# Patient Record
Sex: FEMALE | Race: WHITE | NOT HISPANIC OR LATINO | Employment: OTHER | URBAN - METROPOLITAN AREA
[De-identification: names, ages, dates, MRNs, and addresses within clinical notes are randomized per-mention and may not be internally consistent; named-entity substitution may affect disease eponyms.]

---

## 2023-05-18 LAB
ANION GAP IN SER/PLAS: 15 MMOL/L (ref 10–20)
CALCIUM (MG/DL) IN SER/PLAS: 8.7 MG/DL (ref 8.6–10.3)
CARBON DIOXIDE, TOTAL (MMOL/L) IN SER/PLAS: 35 MMOL/L (ref 21–32)
CHLORIDE (MMOL/L) IN SER/PLAS: 88 MMOL/L (ref 98–107)
CREATININE (MG/DL) IN SER/PLAS: 0.83 MG/DL (ref 0.5–1.05)
GFR FEMALE: 75 ML/MIN/1.73M2
GLUCOSE (MG/DL) IN SER/PLAS: 112 MG/DL (ref 74–99)
MAGNESIUM (MG/DL) IN SER/PLAS: 1.38 MG/DL (ref 1.6–2.4)
POTASSIUM (MMOL/L) IN SER/PLAS: 3 MMOL/L (ref 3.5–5.3)
SODIUM (MMOL/L) IN SER/PLAS: 135 MMOL/L (ref 136–145)
UREA NITROGEN (MG/DL) IN SER/PLAS: 15 MG/DL (ref 6–23)

## 2023-06-27 LAB
ANION GAP IN SER/PLAS: 14 MMOL/L (ref 10–20)
CALCIUM (MG/DL) IN SER/PLAS: 9.1 MG/DL (ref 8.6–10.3)
CARBON DIOXIDE, TOTAL (MMOL/L) IN SER/PLAS: 31 MMOL/L (ref 21–32)
CHLORIDE (MMOL/L) IN SER/PLAS: 97 MMOL/L (ref 98–107)
CREATININE (MG/DL) IN SER/PLAS: 0.94 MG/DL (ref 0.5–1.05)
ERYTHROCYTE DISTRIBUTION WIDTH (RATIO) BY AUTOMATED COUNT: 22.9 % (ref 11.5–14.5)
ERYTHROCYTE MEAN CORPUSCULAR HEMOGLOBIN CONCENTRATION (G/DL) BY AUTOMATED: 28.6 G/DL (ref 32–36)
ERYTHROCYTE MEAN CORPUSCULAR VOLUME (FL) BY AUTOMATED COUNT: 87 FL (ref 80–100)
ERYTHROCYTES (10*6/UL) IN BLOOD BY AUTOMATED COUNT: 4.79 X10E12/L (ref 4–5.2)
GFR FEMALE: 64 ML/MIN/1.73M2
GLUCOSE (MG/DL) IN SER/PLAS: 63 MG/DL (ref 74–99)
HEMATOCRIT (%) IN BLOOD BY AUTOMATED COUNT: 41.9 % (ref 36–46)
HEMOGLOBIN (G/DL) IN BLOOD: 12 G/DL (ref 12–16)
LEUKOCYTES (10*3/UL) IN BLOOD BY AUTOMATED COUNT: 9.7 X10E9/L (ref 4.4–11.3)
PLATELETS (10*3/UL) IN BLOOD AUTOMATED COUNT: 420 X10E9/L (ref 150–450)
POTASSIUM (MMOL/L) IN SER/PLAS: 3.8 MMOL/L (ref 3.5–5.3)
SODIUM (MMOL/L) IN SER/PLAS: 138 MMOL/L (ref 136–145)
UREA NITROGEN (MG/DL) IN SER/PLAS: 11 MG/DL (ref 6–23)

## 2023-08-28 ENCOUNTER — HOSPITAL ENCOUNTER (OUTPATIENT)
Dept: DATA CONVERSION | Facility: HOSPITAL | Age: 73
End: 2023-08-29
Attending: STUDENT IN AN ORGANIZED HEALTH CARE EDUCATION/TRAINING PROGRAM | Admitting: STUDENT IN AN ORGANIZED HEALTH CARE EDUCATION/TRAINING PROGRAM
Payer: MEDICARE

## 2023-08-28 DIAGNOSIS — N70.11 CHRONIC SALPINGITIS: ICD-10-CM

## 2023-08-28 DIAGNOSIS — D27.0 BENIGN NEOPLASM OF RIGHT OVARY: ICD-10-CM

## 2023-08-28 DIAGNOSIS — N70.01 ACUTE SALPINGITIS: ICD-10-CM

## 2023-08-28 DIAGNOSIS — I42.9 CARDIOMYOPATHY, UNSPECIFIED (MULTI): ICD-10-CM

## 2023-08-28 DIAGNOSIS — R19.09 OTHER INTRA-ABDOMINAL AND PELVIC SWELLING, MASS AND LUMP: ICD-10-CM

## 2023-08-28 DIAGNOSIS — D27.1 BENIGN NEOPLASM OF LEFT OVARY: ICD-10-CM

## 2023-08-28 DIAGNOSIS — R19.00 INTRA-ABDOMINAL AND PELVIC SWELLING, MASS AND LUMP, UNSPECIFIED SITE: ICD-10-CM

## 2023-08-28 DIAGNOSIS — E03.9 HYPOTHYROIDISM, UNSPECIFIED: ICD-10-CM

## 2023-08-28 DIAGNOSIS — N83.8 OTHER NONINFLAMMATORY DISORDERS OF OVARY, FALLOPIAN TUBE AND BROAD LIGAMENT: ICD-10-CM

## 2023-08-28 DIAGNOSIS — J44.9 CHRONIC OBSTRUCTIVE PULMONARY DISEASE, UNSPECIFIED (MULTI): ICD-10-CM

## 2023-08-29 LAB
ANION GAP IN SER/PLAS: 18 MMOL/L (ref 10–20)
CALCIUM (MG/DL) IN SER/PLAS: 9.6 MG/DL (ref 8.6–10.6)
CARBON DIOXIDE, TOTAL (MMOL/L) IN SER/PLAS: 29 MMOL/L (ref 21–32)
CHLORIDE (MMOL/L) IN SER/PLAS: 99 MMOL/L (ref 98–107)
CREATININE (MG/DL) IN SER/PLAS: 0.8 MG/DL (ref 0.5–1.05)
ERYTHROCYTE DISTRIBUTION WIDTH (RATIO) BY AUTOMATED COUNT: 14.9 % (ref 11.5–14.5)
ERYTHROCYTE MEAN CORPUSCULAR HEMOGLOBIN CONCENTRATION (G/DL) BY AUTOMATED: 29.9 G/DL (ref 32–36)
ERYTHROCYTE MEAN CORPUSCULAR VOLUME (FL) BY AUTOMATED COUNT: 89 FL (ref 80–100)
ERYTHROCYTES (10*6/UL) IN BLOOD BY AUTOMATED COUNT: 5 X10E12/L (ref 4–5.2)
GFR FEMALE: 78 ML/MIN/1.73M2
GLUCOSE (MG/DL) IN SER/PLAS: 77 MG/DL (ref 74–99)
HEMATOCRIT (%) IN BLOOD BY AUTOMATED COUNT: 44.5 % (ref 36–46)
HEMOGLOBIN (G/DL) IN BLOOD: 13.3 G/DL (ref 12–16)
LEUKOCYTES (10*3/UL) IN BLOOD BY AUTOMATED COUNT: 14.7 X10E9/L (ref 4.4–11.3)
MAGNESIUM (MG/DL) IN SER/PLAS: 2.51 MG/DL (ref 1.6–2.4)
NRBC (PER 100 WBCS) BY AUTOMATED COUNT: 0 /100 WBC (ref 0–0)
PLATELETS (10*3/UL) IN BLOOD AUTOMATED COUNT: 509 X10E9/L (ref 150–450)
POTASSIUM (MMOL/L) IN SER/PLAS: 4.9 MMOL/L (ref 3.5–5.3)
POTASSIUM (MMOL/L) IN SER/PLAS: 5.7 MMOL/L (ref 3.5–5.3)
SODIUM (MMOL/L) IN SER/PLAS: 140 MMOL/L (ref 136–145)
UREA NITROGEN (MG/DL) IN SER/PLAS: 18 MG/DL (ref 6–23)

## 2023-08-30 LAB
COMPLETE PATHOLOGY REPORT: NORMAL
CONVERTED CLINICAL DIAGNOSIS-HISTORY: NORMAL
CONVERTED DIAGNOSIS COMMENT: NORMAL
CONVERTED FINAL DIAGNOSIS: NORMAL
CONVERTED FINAL REPORT PDF LINK TO COPY AND PASTE: NORMAL
CONVERTED SPECIMEN DESCRIPTION: NORMAL

## 2023-09-12 PROBLEM — R42 LIGHTHEADEDNESS: Status: ACTIVE | Noted: 2023-09-12

## 2023-09-12 PROBLEM — R53.81 MALAISE: Status: ACTIVE | Noted: 2023-09-12

## 2023-09-12 PROBLEM — R74.01 ELEVATED TRANSAMINASE LEVEL: Status: ACTIVE | Noted: 2023-09-12

## 2023-09-12 PROBLEM — K76.1 HEPATIC CONGESTION: Status: ACTIVE | Noted: 2023-09-12

## 2023-09-12 PROBLEM — R04.2 HEMOPTYSIS: Status: ACTIVE | Noted: 2023-09-12

## 2023-09-12 PROBLEM — C32.0 MALIGNANT NEOPLASM OF GLOTTIS (MULTI): Status: ACTIVE | Noted: 2023-09-12

## 2023-09-12 PROBLEM — R04.1 HEMORRHAGE FROM THROAT: Status: ACTIVE | Noted: 2023-09-12

## 2023-09-12 PROBLEM — J01.90 ACUTE SINUSITIS: Status: ACTIVE | Noted: 2023-09-12

## 2023-09-12 PROBLEM — R09.89 PULMONARY VASCULAR CONGESTION: Status: ACTIVE | Noted: 2023-09-12

## 2023-09-12 PROBLEM — I95.1 ORTHOSTATIC HYPOTENSION: Status: ACTIVE | Noted: 2023-09-12

## 2023-09-12 PROBLEM — R19.00 PELVIC MASS IN FEMALE: Status: ACTIVE | Noted: 2023-09-12

## 2023-09-12 PROBLEM — R79.89 ELEVATED TROPONIN LEVEL: Status: ACTIVE | Noted: 2023-09-12

## 2023-09-12 PROBLEM — T81.49XA SURGICAL WOUND INFECTION: Status: ACTIVE | Noted: 2023-09-12

## 2023-09-12 PROBLEM — R11.10 EMESIS: Status: ACTIVE | Noted: 2023-09-12

## 2023-09-12 PROBLEM — I27.20 PULMONARY HYPERTENSION (MULTI): Status: ACTIVE | Noted: 2023-09-12

## 2023-09-12 PROBLEM — R13.10 DYSPHAGIA: Status: ACTIVE | Noted: 2023-09-12

## 2023-09-12 PROBLEM — E03.9 ACQUIRED HYPOTHYROIDISM: Status: ACTIVE | Noted: 2023-09-12

## 2023-09-12 PROBLEM — J18.9 PNEUMONIA: Status: ACTIVE | Noted: 2023-09-12

## 2023-09-12 PROBLEM — K21.9 GERD (GASTROESOPHAGEAL REFLUX DISEASE): Status: ACTIVE | Noted: 2023-09-12

## 2023-09-12 PROBLEM — C13.9: Status: ACTIVE | Noted: 2023-09-12

## 2023-09-12 PROBLEM — K04.7 DENTAL ABSCESS: Status: ACTIVE | Noted: 2023-09-12

## 2023-09-12 PROBLEM — T66.XXXA EFFECTS OF RADIATION: Status: ACTIVE | Noted: 2023-09-12

## 2023-09-12 PROBLEM — N83.8 MASS OF OVARY: Status: ACTIVE | Noted: 2023-09-12

## 2023-09-12 PROBLEM — J90 BILATERAL PLEURAL EFFUSION: Status: ACTIVE | Noted: 2023-09-12

## 2023-09-12 PROBLEM — I10 MALIGNANT ESSENTIAL HYPERTENSION: Status: ACTIVE | Noted: 2023-09-12

## 2023-09-12 PROBLEM — D49.59 BILATERAL OVARIAN TUMORS: Status: ACTIVE | Noted: 2023-09-12

## 2023-09-12 PROBLEM — R09.02 HYPOXIA: Status: ACTIVE | Noted: 2023-09-12

## 2023-09-12 PROBLEM — I50.30 (HFPEF) HEART FAILURE WITH PRESERVED EJECTION FRACTION (MULTI): Status: ACTIVE | Noted: 2023-09-12

## 2023-09-12 PROBLEM — J86.0 TEF (TRACHEOESOPHAGEAL FISTULA) (MULTI): Status: ACTIVE | Noted: 2023-09-12

## 2023-09-12 PROBLEM — T85.698A: Status: ACTIVE | Noted: 2023-09-12

## 2023-09-12 PROBLEM — S42.302A LEFT HUMERAL FRACTURE: Status: ACTIVE | Noted: 2023-09-12

## 2023-09-12 LAB
COMPLETE PATHOLOGY REPORT: NORMAL
CONVERTED CLINICAL DIAGNOSIS-HISTORY: NORMAL
CONVERTED FINAL DIAGNOSIS: NORMAL
CONVERTED FINAL REPORT PDF LINK TO COPY AND PASTE: NORMAL
CONVERTED GROSS DESCRIPTION: NORMAL
CONVERTED INTRAOPERATIVE DIAGNOSIS: NORMAL

## 2023-09-12 RX ORDER — ZINC GLUCONATE 50 MG
TABLET ORAL DAILY
COMMUNITY
Start: 2023-08-09

## 2023-09-12 RX ORDER — CHOLECALCIFEROL (VITAMIN D3) 25 MCG
1 TABLET ORAL DAILY
COMMUNITY
Start: 2018-07-26

## 2023-09-12 RX ORDER — METOPROLOL SUCCINATE 25 MG/1
25 TABLET, EXTENDED RELEASE ORAL DAILY
COMMUNITY
Start: 2023-07-24

## 2023-09-12 RX ORDER — LIOTHYRONINE SODIUM 25 UG/1
TABLET ORAL
COMMUNITY

## 2023-09-12 RX ORDER — ASPIRIN 325 MG
1 TABLET ORAL DAILY
COMMUNITY

## 2023-09-12 RX ORDER — ATORVASTATIN CALCIUM 10 MG/1
10 TABLET, FILM COATED ORAL DAILY
COMMUNITY

## 2023-09-12 RX ORDER — LEVOTHYROXINE SODIUM 75 UG/1
75 TABLET ORAL
COMMUNITY

## 2023-09-12 RX ORDER — SPIRONOLACTONE 25 MG/1
25 TABLET ORAL DAILY
COMMUNITY
Start: 2023-05-18

## 2023-09-12 RX ORDER — ESOMEPRAZOLE MAGNESIUM 40 MG/1
GRANULE, DELAYED RELEASE ORAL
COMMUNITY

## 2023-09-12 RX ORDER — CYCLOBENZAPRINE HCL 5 MG
5 TABLET ORAL EVERY 8 HOURS PRN
COMMUNITY
Start: 2023-04-29

## 2023-09-12 RX ORDER — FUROSEMIDE 40 MG/1
20 TABLET ORAL DAILY
COMMUNITY
Start: 2023-08-18

## 2023-09-12 RX ORDER — GABAPENTIN 250 MG/5ML
SOLUTION ORAL
COMMUNITY
Start: 2014-02-19

## 2023-09-12 RX ORDER — TRAZODONE HYDROCHLORIDE 50 MG/1
50 TABLET ORAL NIGHTLY
COMMUNITY
Start: 2023-04-29

## 2023-09-12 RX ORDER — HYDROXYUREA 500 MG/1
CAPSULE ORAL 2 TIMES DAILY
COMMUNITY

## 2023-09-12 RX ORDER — POTASSIUM CHLORIDE 750 MG/1
10 CAPSULE, EXTENDED RELEASE ORAL DAILY
COMMUNITY
Start: 2023-05-18

## 2023-09-12 RX ORDER — IPRATROPIUM BROMIDE AND ALBUTEROL SULFATE 2.5; .5 MG/3ML; MG/3ML
SOLUTION RESPIRATORY (INHALATION)
COMMUNITY
Start: 2023-07-14

## 2023-09-12 RX ORDER — LIDOCAINE 50 MG/G
PATCH TOPICAL
COMMUNITY
Start: 2023-07-18

## 2023-09-12 RX ORDER — ASPIRIN/ACETAMINOPHEN/CAFFEINE 227-194-33
TABLET ORAL EVERY 6 HOURS PRN
COMMUNITY

## 2023-09-12 RX ORDER — NYSTATIN 100000 [USP'U]/ML
SUSPENSION ORAL
COMMUNITY
Start: 2016-11-30

## 2023-09-12 RX ORDER — VENLAFAXINE HYDROCHLORIDE 150 MG/1
1 CAPSULE, EXTENDED RELEASE ORAL DAILY
COMMUNITY
Start: 2014-04-08

## 2023-09-12 RX ORDER — FLUDROCORTISONE ACETATE 0.1 MG/1
0.1 TABLET ORAL 2 TIMES DAILY
COMMUNITY

## 2023-09-12 RX ORDER — ESTRADIOL 0.1 MG/G
CREAM VAGINAL
COMMUNITY
Start: 2023-08-01

## 2023-09-12 RX ORDER — LANOLIN ALCOHOL/MO/W.PET/CERES
1 CREAM (GRAM) TOPICAL DAILY
COMMUNITY
Start: 2023-05-18

## 2023-09-12 RX ORDER — FENTANYL 50 UG/1
PATCH TRANSDERMAL
COMMUNITY
Start: 2023-01-23

## 2023-09-29 VITALS — WEIGHT: 145.06 LBS | BODY MASS INDEX: 22.77 KG/M2 | HEIGHT: 67 IN

## 2023-09-30 NOTE — PROGRESS NOTES
Service: Gynecology Oncology     Subjective Data:   CESAR THORNTON is a 72 year old Female who is Hospital Day # 2 and POD #1 for 1. Laparoscopic bilateral salpingo-oophorectomy.     Patient is doing well.  Pain is controlled, no chest pain/fever/chills or dyspnea, no complaints overnight.    Objective Data:     Objective Information:      T   P  R  BP   MAP  SpO2   Value  36  72  16  126/81      95%  Date/Time 8/29 5:05 8/29 5:05 8/29 5:05 8/29 5:05    8/29 5:05  Range  (36C - 36.1C )  (72 - 89 )  (16 - 20 )  (115 - 133 )/ (60 - 81 )    (92% - 95% )   As of 28-Aug-2023 16:30:00, patient is on 3 L/min of oxygen      Pain reported at 8/29 3:02: 7 = Severe    Physical Exam by System:    Constitutional: Resting in bed, no acute distress   Eyes: Clear sclera   ENMT: MMM   Head/Neck: NCAT   Respiratory/Thorax: Breathing comfortably, lungs  CTAB   Cardiovascular: Normal rate, regular rhythm   Gastrointestinal: Abdomen soft, nondistended, mildly  tender to palpation, abdominal port sites covered with baby islands in place, no rebound/guarding   Extremities: No peripheral edema   Neurological: Awake and conversant   Psychological: Appropriate mood and affect   Skin: No visible rashes     Recent Lab Results:    Results:    CBC: 8/29/2023 05:55              \     Hgb     /                              \     13.3       /  WBC  ----------------  Plt               14.7 H    ----------------    509 H            /     Hct     \                              /     44.5       \            RBC: 5.00     MCV: 89           BMP: 8/29/2023 05:55  NA+        Cl-     BUN  /                         140    99    18  /  --------------------------------  Glucose                ---------------------------  77    K+     HCO3-   Creat \                         5.7 H 29    0.80  \  Calcium : 9.6     Anion Gap : 18      Assessment and Plan:   Code Status:  ·  Code Status Full Code     Assessment:    71yo now s/p laparoscopic bilateral  salpingo-oophorectomy on 8/28 for bilateral adnexal masses with benign frozen pathology, overall recovering appropriately    Postoperative care  - Continue pain medications per ERAS  - Power removed in OR, voiding  - Reg diet   - Recovering appropriately    Cardiomyopathy with reduced EF  - Continue home metoprolol     COPD  - Satting appropriately on room air, lungs clear  - Trach in place    Hypothyroidism  - Continue home synthroid    DVT ppx  - Heparin  - SCDs    Dispo: DC today    Campbell Hines MD          Attestation:   Note Completion:  I am a:  Resident/Fellow   Attending Attestation I reviewed the resident/fellow?s documentation and discussed the patient with the resident/fellow.  I agree with the resident/fellow?s medical  decision making as documented in the note.          Electronic Signatures:  Marah Holman)  (Signed 29-Aug-2023 14:48)   Authored: Note Completion   Co-Signer: Service, Subjective Data, Objective Data, Assessment and Plan, Note Completion  Campbell Hines (Fellow))  (Signed 29-Aug-2023 08:44)   Authored: Service, Subjective Data, Objective Data, Assessment  and Plan, Note Completion      Last Updated: 29-Aug-2023 14:48 by Marah Holman)

## 2023-09-30 NOTE — H&P
History of Present Illness:   History Present Illness:  Reason for surgery: Bilateral adnexal/ovarian masses   HPI:    Ignacia Richards is a 73yo female with bilateral adnexal/ovarian masses suspicious for malignancy p/f laparoscopic BSO, removal of pelvic mass, and possible staging     PreOp labs: Hb 12.1, Plt 442, Cr 0.70    Tumor markers: mild elevation in  =139.8; difficult to interpret in setting of cardiac issues and COPD    PMH:   ER + Inpt @  [facility] for edema and failure to thrive, discharged 06/01/23   3/2023: comminuted fx of R patella   : hypopharygneal cancer   hypothyroidism   GERD   cardiomyopathy with reduced EF   pleural effusion  COPD       Past Surgical History:   2023: ORIF s/ R patellar fx   multiple trach+ peg placement   2014: ENT surgery - R radial free flap, partial rib resection for vessel   exploration, anterior neck wound exploration, split thickness skin graft,   salivary bypass tube       Family History:  Otherwise denies a history of gyn related cancers including   ovarian, endometrial, breast, pancreas, and GI cancers.       Social History: Former smoker denies alcohol use or recreational drug use.       OBGYN History:  The patient is a .  Entered menopause.       Health Screening:   Mammography Results: normal   PAP Results: normal       Social History:   Smoking Status: prior smoker, quit >5 years ago   Alcohol Use: denies   Drug Use: denies   Additional History: lives at home with daughter/family prior to fall/patella fx in 2023. Since has been in nursing facilities before and after inpt stay in 2023.     Allergies:   succinylcholine: Drug, Other, Active   azithromycin: Drug, Rash, Active   Tape - Adhesive, Bandaids, Paper: Environment, Rash, Active   Neosporin: Drug, Unknown, Active   bacitracin: Drug, Unknown, Active   Dust: Environment, Unknown, Active   Pollen: Environment, Unknown, Active       Outpatient Medication Profile:   - potassium chloride  10 mEq oral capsule, extended release: 1 cap(s) orally   - polyethylene glycol 3350 oral powder for reconstitution: 17 gram(s) orally   once a day  - acetaminophen 160 mg/5 mL oral liquid: 20.31 milliliter(s) orally every 6   hours, As Needed -Pain - Mild (1-3)   - guaiFENesin 100 mg/5 mL oral liquid: 25 milliliter(s) orally once a day (at   bedtime)  - gabapentin 250 mg/5 mL oral solution: 9 milliliter(s) orally 2 times a day   - HYDROmorphone 1 mg/mL oral liquid: 5 milliliter(s) orally every 3 hours, As   Needed -Pain -moderate to severe)   - bisacodyl 5 mg oral delayed release tablet: 1 tab(s) orally once, As needed,   Constipation, Start Date: 31-May-2023   - Synthroid 75 mcg (0.075 mg) oral tablet: 1 tab(s) orally once a day   - Cytomel 25 mcg oral tablet: 1 tab(s) orally once a day   - Lipitor 10 mg oral tablet: 1 tab(s) orally once a day   - esomeprazole 40 mg oral delayed release capsule: 1 cap(s) orally once a day   - Aspirin Enteric Coated 81 mg oral delayed release tablet: 1 tab(s) orally once   a day   - venlafaxine 75 mg oral tablet: 2 tab(s) orally once a day (at bedtime)   - furosemide 40 mg oral tablet: 1 tab(s) orally once a day   - magnesium oxide 400 mg oral tablet: 1 tab(s) orally once a day   - Zinc 140 mg (as elemental zinc 50 mg) oral tablet: 1 tab(s) orally once a day   - spironolactone 25 mg oral tablet: 1 tab(s) orally once a day   - Sudafed 30 mg oral tablet: 1 tab(s) orally every 6 hours, As Needed   - metoprolol succinate 25 mg oral tablet, extended release: 1 tab(s) orally once   a day   - lidocaine 5% patch: 1 patch transdermal once a day, As Needed 12 HRS ON/12 HRS   OFF   - Vanquish oral tablet: 1  orally every 6 hours, As Needed for headache   =======================================================    IMAGING:     CT Abdomen and Pelvis with IV Contrast [May 23 2023  2:24PM]     Impression:      Pleural effusions atelectasis and right atrial dilatation are   included on images at the  thoracic base. Please refer to chest CT   report for more information about thoracic pathology.       Bilateral cystic ovarian masses are noted with right mass showing   some internal septations and both masses having calcified components.   Ovarian neoplasms are almost certainty although benign or malignant   character can not be determined from the imaging characteristics   alone.       Free fluid is noted in the pelvis. This is more likely related to the   patient's passive congestion from cardiac failure and less likely   related to the ovarian masses.       Bladder wall is diffusely thickened which can be related to   inflammation or hypertrophy.       Fluid density is noted in the upper vagina which can be luminal fluid   or related to a cyst from the cervix or vaginal wall projecting into   the vagina.       Liver is heterogeneous and hepatic veins are dilated. The   heterogeneous enhancement pattern is potentially related to the   passive congestion.       Nonobstructive left renal stones.                 Allergies:        Allergies:  ·  succinylcholine : Other  ·  azithromycin : Rash  ·  Tape  - Adhesive, Bandaids, Paper: Rash  ·  Neosporin : Unknown  ·  bacitracin : Unknown  ·  Dust : Unknown  ·  Pollen : Unknown    Home Medication Review:   Home Medications Reviewed: yes     Impression/Procedure:   ·  Impression and Planned Procedure: Ignacia Richards is a 73yo female with bilateral adnexal/ovarian masses suspicious for malignancy p/f laparoscopic BSO, removal of pelvic mass, and possible staging       ERAS (Enhanced Recovery After Surgery):  ·  ERAS Patient: yes   ·  CPM/PAT Utilization: no   ·  Immunonutrition Recovery Drink Utilization: no   ·  Carbohydrate Supplement Drink Utilization: no     Review of Systems:   Review of Systems:  All Other Systems: All other systems reviewed and  are negative       Physical Exam by System:    Constitutional: NAD   Eyes: EOMI   Head/Neck: atraumatic    Respiratory/Thorax: normal resp effort   Cardiovascular: well perfused   Extremities: no edema   Neurological: grossly intact   Psychological: normal affect     Consent:   COVID-19 Consent:  ·  COVID-19 Risk Consent Surgeon has reviewed key risks related to the risk of donnell COVID-19 and if they contract COVID-19 what the risks are.     Attestation:   Note Completion:  I am a:  Resident/Fellow   Attending Attestation I saw and evaluated the patient.  I personally obtained the key and critical portions of the history and physical exam or was physically present for key and  critical portions performed by the resident/fellow. I reviewed the resident/fellow?s documentation and discussed the patient with the resident/fellow.  I agree with the resident/fellow?s medical decision making as documented in the note.     I personally evaluated the patient on 28-Aug-2023   Comments/ Additional Findings    Plan laparoscopic BSO and possible staging for bilateral adnexal masses.      Lesley Haro MD  ,  Gynecologic Oncology   [facility], [facility]          Electronic Signatures:  Lesley Haro)  (Signed 28-Aug-2023 07:11)   Authored: Note Completion   Co-Signer: History of Present Illness, Allergies, Home Medication Review, Impression/Procedure, ERAS, Review of Systems, Physical Exam,  Consent, Note Completion  Christine Griffin (Resident))  (Signed 28-Aug-2023 05:57)   Authored: History of Present Illness, Allergies, Home  Medication Review, Impression/Procedure, ERAS, Review of Systems, Physical Exam, Consent, Note Completion      Last Updated: 28-Aug-2023 07:11 by Lesley Haro)

## 2023-09-30 NOTE — DISCHARGE SUMMARY
Send Summary:     Note Recipients:        Discharge:    Summary:   Admission Date: .28-Aug-2023 08:15:00   Discharge Date: 29-Aug-2023   Attending Physician at Discharge: Lesley Haro   Admission Reason: Pelvic Mass   Final Discharge Diagnoses: Pelvic mass   Procedures: Date: 28-Aug-2023 12:24:00  Procedure Name: 1. Laparoscopic bilateral salpingo-oophorectomy   Condition at Discharge: Satisfactory   Disposition at Discharge: .Home   Vital Signs:        T   P  R  BP   MAP  SpO2   Value  36.1  77  18  122/72      97%  Date/Time 8/29 9:12 8/29 9:12 8/29 9:12 8/29 9:12    8/29 9:12  Range  (36C - 36.1C )  (72 - 89 )  (16 - 20 )  (115 - 133 )/ (60 - 81 )    (92% - 97% )   As of 28-Aug-2023 16:30:00, patient is on 3 L/min of oxygen    Date:            Weight/Scale Type:  Height:   28-Aug-2023 18:42  65.8  kg / bed  170.1  cm  Hospital Course:    Ignacia Richards is a 71yo F who presented on 8/28 for scheduled laparoscopic bilateral salpingo-oophorectomy. Please see operative report for full details. Her post-operative  course was complicated by loss of return of vocal function. Patient states it has happened previously in the post-operative setting and improved after 1-2d. Speech and ENT curbsided and recommended close follow-up outpatient, patient agreeable. By POD#1,  she was meeting all post-operative milestones including diet, passing flatus, voiding, and ambulating. Her pain was well-controlled with oral medications and labs were within expected values. Patient was discharged home in satisfactory condition on post-operative  day #1 and is scheduled to follow-up with Dr. Haro on 9/21 at 1:20pm at [facility]. ENT and SLP follow-up appointments have been requested; their offices will reach out to patient for scheduling.     Immunizations:    Immunizations:  22-Oct-2010   .Influenza- Influenza Virus: Immunizations, 22-Oct-2010  04-Oct-2012   .Influenza- Influenza  Virus: Immunizations, 04-Oct-2012  04-Oct-2012   .Pneumonia- Pneumococcal polysaccharide vaccine-adult: Immunizations,  04-Oct-2012  31-Oct-2013   .Influenza- Influenza Virus: Immunizations, 31-Oct-2013  01-Sergio-2020   .Influenza- Influenza Virus: Immunizations, 01-Jan-2020 01-Jan-2020   PCV- Pneumococcal conjugate vaccine: Immunizations, 01-Jan-2020      Discharge Information:    and Continuing Care:   Lab Results - Pending:    Surgical Pathology Drawn at 28-Aug-2023 12:20:00  Cytology-Non GYN Drawn at 28-Aug-2023 11:07:00  Radiology Results - Pending: None   Discharge Instructions:    Activity:           activity as tolerated.    Nutrition/Diet:           low sodium,  Resume home diet.  <2g sodium restricted heart healthy diet          Diet Consistency/Texture:   pureed          Fluid Restriction:   <2L fluid restricted diet    Wound Care:           Wound Site:   abdomen          Wound Type:   surgical incision          Other Instructions:   You can gently remove the adhesive dressings on your incisions when you get home. You may have steri strips (small paper tape) along your incisions underneath. If they do not fall off in a week, gently peel them off. You  can shower, pat dry; do not soak in a tub.  The steri strips will fall off on their own.    Additional Orders:           Additional Instructions:   Take your opiod prescription (Hydromorphone) in conjunction with the anti-inflammatory (Ibuprofen) as they work differently.  Opiods can cause constipation;  you may take an over-the-counter laxative (e.g. Miralax) in  addition to your stool softener prescription (Colace-Senna) if needed.  Do not exceed 3,000 milligrams daily of Acetaminophen (Tylenol) from any source (e.g. cold meds).  You may take over-the-counter Simethicone (e.g. Gas-X, Mylicon) if needed for gas  pain.         Follow Up Appointments:    Follow-Up Appointment 01:           Physician/Dept/Service:   Dr. Lesley Haro MD - GYN Oncology           Reason for Referral:   Post-operative follow-up          Scheduled Date/Time:   21-Sep-2023 13:20          Location:   [facility] at [facility], [address] [address]          Phone Number:   [phone]    Follow-Up Appointment 02:           Physician/Dept/Service:   ENT- Dr. Cintron and Faith- Yoselin Song          Reason for Referral:   Post- surgical follow up for loss of vocal function          Scheduled Date/Time:   27-Oct-2023 10:15          Location:   [facility] 1st Floor Desk A, [address] [place] [address]          Phone Number:   [phone]    Follow-Up Appointment 03:           Physician/Dept/Service:   Damaris Song- Anat Cuevas Pathologist          Scheduled Date/Time:   07-Sep-2023 13:45          Location:   [facility], [address] [place] [address]          Phone Number:   [phone]    Discharge Medications: Home Medication   venlafaxine 75 mg oral tablet - 2 tab(s) oral once a day (at bedtime)  Zinc 140 mg (as elemental zinc 50 mg) oral tablet - 1 tab(s) oral once a day  acetaminophen 500 mg oral tablet - 1 tab(s) orally every 4 hours x 7 days   -.Meds to Beds - .ADOD 8/29/23   docusate-senna 50 mg-8.6 mg oral capsule - 1 cap(s) orally 2 times a day. Hold for diarrhea.   -.Meds to Beds - .ADOD 8/29/23    mg oral tablet - 1 tab(s) orally every 6 hours x 7 days.  -.Meds to Beds - .ADOD 8/29/23   simethicone 80 mg oral tablet, chewable - 1 tab(s) chewed 4 times a day as needed  aspirin 325 mg oral tablet - 1 tab(s) oral once a day  albuterol-ipratropium 2.5 mg-0.5 mg/3 mL inhalation solution - 1 INH inhalation prn  Claritin 10 mg oral tablet - 1 tab(s) oral once a day  lidocaine patch - 0     NexIUM 40 mg oral delayed release capsule - 1 tab(s) oral once a day  Synthroid 75 mcg (0.075 mg) oral tablet - 1 tab(s) oral once a day  Cytomel 25 mcg oral tablet - 1 tab(s) oral  once a day  Lipitor 10 mg oral tablet - 1 tab(s) oral once a day  furosemide 40 mg oral tablet - 1 tab(s) oral once a day  gabapentin 250 mg/5 mL oral solution - 9 mL oral 2 times a day  guaiFENesin 100 mg/5 mL oral liquid - 25 mL oral once a day (at bedtime)  metoprolol 25 mg oral tablet, extended release - 1 tab(s) oral once a day     PRN Medication   Sudafed 30 mg oral tablet - 1 tab(s) oral once a day as needed  Vanquish oral tablet - 1  oral every 6 hours as needed  HYDROmorphone 1 mg/mL oral liquid - 5 mL oral prn as needed     DNR Status:   ·  Code Status Code Status order at time of discharge: Full Code     Attestation:   Note Completion:  I am a:  Resident/Fellow   Attending Attestation I reviewed the resident/fellow?s documentation and discussed the patient with the resident/fellow.  I agree with the resident/fellow?s medical  decision making as documented in the note.          Electronic Signatures:  Marah Holman)  (Signed 29-Aug-2023 14:46)   Authored: Ongoing Care, Note Completion   Co-Signer: Send Summary, Summary Content, Immunizations, Ongoing Care, DNR Status, Note Completion  Christine Griffin (Resident))  (Signed 29-Aug-2023 14:03)   Authored: Send Summary, Summary Content, Immunizations,  Ongoing Care, DNR Status, Note Completion      Last Updated: 29-Aug-2023 14:46 by Marah Holman)

## 2023-10-01 NOTE — OP NOTE
Post Operative Note:     PreOp Diagnosis: Pelvic masses   Post-Procedure Diagnosis: Bilateral adnexal masses   Procedure: 1. Laparoscopic bilateral salpingo-oophorectomy   Surgeon: Estrellita   Resident/Fellow/Other Assistant: Stacey Hines Rossi   Anesthesia: GET   I.V. Fluids: 1000cc crystalloid   Estimated Blood Loss (mL): 25cc   Specimen: yes. Bilateral fallopian tubes and ovaries   Findings: Bilateral cystic ovarian masses, frozen  = benign.  Anterior surface of rectosigmoid colon with numerous nonspecific <1cm nodules suspected to be inflammatory in nature.  Otherwise normal anatomy.   Urine Output: 600cc     Operative Report Dictated:  Dictation: not applicable - note contains Operative  Report   Operative Report:    After informed consent was confirmed, the patient was taken to the operating room with an IV in place. A preoperative huddle and timeout were performed, with all  OR personnel confirming correct patient and procedure. The patient was moved to the operating room table and SCDs were placed.  General anesthesia was induced without difficulty. She was then placed in the dorsal lithotomy position on the operating room  table using Akash stirrups. She was prepped and draped in a normal sterile fashion for a laparoscopic hysterectomy. A surgical pause was performed. The patient's identity and surgical procedure were again confirmed by all the surgical personnel. The patient  received preoperative antibiotics.    A cueva catheter was placed.  We then turned our attention to the laparoscopic portion of the operation. A 12mm vertical skin incision was made supraumbilical. This was carried down to the level of the fascia with two S retractors. The fascia was elevated  with 2 kocher clamps and incised with a scalpel.  Both sides of the fascia were then tagged with 0-vicryl suture.  The peritoneum was then elevated with 2 hemostats and was entered sharply with metzenbaum scissors.  The jonnathan port was  then placed. CO2  was then insufflated into the abdomen.     Once this was accomplished, we then carefully inspected the abdomen and there was no evidence of injury. The patient was placed in deep Trendelenburg. We then placed three 5-mm accessory ports.  All were placed under direct visualization after infiltration  with Marcaine.  The small bowel was manipulated out of the pelvis.     We then proceeded with the left oophorectomy.  The mass was large and cystic with adhesions to the left pelvic sidewall.  We opened up the left sidewall retroperitoneum and identified the ureter.  We made a window between the ureter and the left IP.   We skeletonized the IP and sealed/incised it with the ligasure.  We then sealed/incised the uteroovarian ligament.  After the blood supply was taken we carefully dissected the cyst from the sidewall with care to avoid the ureter.  After the cyst was completely  removed we placed it in an endocatch bag and removed it via the umbilical incision.  The cyst was drained in the bag with no spillage to allow removal.  It was sent for frozen pathology which was benign.  We then proceeded with the right salpingo-oophorectomy.   We opened up the right pelvic sidewall and identified the ureter.  We made a window between the right ureter and the right IP.  We skeletonized the IP and sealed/cut it with ligasure.  We sealed and cut the R uteroovarian ligament.  We incised peritoneal  attachments and eventually palced the specimen in a bag and removed it and sent for permanent pathology.  We irrigated the pelvis and confirmed good hemostasis.    The 5mm ports were all removed under direct visualization.  The 12mm port was removed and the fascia was then closed with another figure-of-eight suture of 0-vicryl followed by tying the two previously-tagged end of fascia together.  The port sites were  all irrigated.  Hemostasis was noted.  Ports were closed using 4-0 vicryl in a subcuticular fashion,  followed by steristrips.      The patient was awoken from general anesthesia without difficulty.    Sponge, needle, instrument counts were correct x 2.    Campbell Hines MD    Attestation:   Note Completion:  I am a: Resident/Fellow   Attending Attestation I was present for the entire procedure          Electronic Signatures:  Campbell Hines (MD (Fellow))  (Signed 29-Aug-2023 08:35)   Authored: Post Operative Note, Note Completion  Lesley Haro)  (Signed 29-Aug-2023 13:56)   Authored: Note Completion   Co-Signer: Post Operative Note, Note Completion      Last Updated: 29-Aug-2023 13:56 by Lesley Haro)

## 2023-10-16 ENCOUNTER — APPOINTMENT (OUTPATIENT)
Dept: CARDIOLOGY | Facility: CLINIC | Age: 73
End: 2023-10-16
Payer: MEDICARE

## 2023-10-20 ENCOUNTER — TELEPHONE (OUTPATIENT)
Dept: CARDIOLOGY | Facility: CLINIC | Age: 73
End: 2023-10-20
Payer: MEDICARE

## 2023-10-20 NOTE — TELEPHONE ENCOUNTER
----- Message from Jaren Lucio MD sent at 10/20/2023  1:36 PM EDT -----  O to decrease to 20 mg  ----- Message -----  From: Maged Ho RN  Sent: 10/20/2023   1:26 PM EDT  To: Jaren Lucio MD    Daughter called in to ask if patient can take a reduced dose of Lasix; patient believes this was causing her to feel sick.  She takes 40 mg daily.    10/20/23  1824  Called daughter; no answer. Left  informing okay to decreased to 20 mg daily on lasix.    Will update medication profile

## 2023-10-24 ENCOUNTER — APPOINTMENT (OUTPATIENT)
Dept: PULMONOLOGY | Facility: HOSPITAL | Age: 73
End: 2023-10-24
Payer: MEDICARE

## 2023-10-27 ENCOUNTER — APPOINTMENT (OUTPATIENT)
Dept: OTOLARYNGOLOGY | Facility: HOSPITAL | Age: 73
End: 2023-10-27
Payer: MEDICARE

## 2024-01-23 ENCOUNTER — TELEPHONE (OUTPATIENT)
Dept: SPEECH THERAPY | Facility: CLINIC | Age: 74
End: 2024-01-23
Payer: MEDICARE

## 2024-01-23 NOTE — PROGRESS NOTES
Speech-Language Pathology                 Therapy Communication Note    Patient Name: Ignacia Richards  MRN: [id number]  Today's Date: 1/23/2024     Discipline: Speech Language Pathology    Comment: The patient's daughter called to inquire about where to get a new laryngectomy tube. The patient is in a rehab facility in [place], [place] following a fall and broken clavicle. The patient also had a recent pneumonia per report. I discussed that her voice prosthesis most likely needs to be changed and leaking may be the reason for the recent pneumonia. It is probably unlikely that she can get to [facility] at this time for TEP and airway management. She will call back with an update with her mom's discharge plans. I suggested I may be able to find a local SLP who works with [employer]. Or the facility can order an ENT/SLP consult for direction.             Statement Selected